# Patient Record
Sex: MALE | Race: WHITE | Employment: FULL TIME | URBAN - METROPOLITAN AREA
[De-identification: names, ages, dates, MRNs, and addresses within clinical notes are randomized per-mention and may not be internally consistent; named-entity substitution may affect disease eponyms.]

---

## 2018-05-31 ENCOUNTER — OFFICE VISIT (OUTPATIENT)
Dept: FAMILY MEDICINE CLINIC | Facility: CLINIC | Age: 38
End: 2018-05-31

## 2018-05-31 VITALS
BODY MASS INDEX: 35.94 KG/M2 | WEIGHT: 223.63 LBS | RESPIRATION RATE: 17 BRPM | DIASTOLIC BLOOD PRESSURE: 76 MMHG | SYSTOLIC BLOOD PRESSURE: 118 MMHG | TEMPERATURE: 99 F | HEART RATE: 94 BPM | OXYGEN SATURATION: 98 % | HEIGHT: 66 IN

## 2018-05-31 DIAGNOSIS — Z13.0 SCREENING FOR DEFICIENCY ANEMIA: Primary | ICD-10-CM

## 2018-05-31 DIAGNOSIS — Z00.00 WELL ADULT EXAM: ICD-10-CM

## 2018-05-31 DIAGNOSIS — Z13.220 SCREENING, LIPID: ICD-10-CM

## 2018-05-31 DIAGNOSIS — Z13.29 SCREENING FOR THYROID DISORDER: ICD-10-CM

## 2018-05-31 DIAGNOSIS — Z13.1 SCREENING FOR DIABETES MELLITUS: ICD-10-CM

## 2018-05-31 PROBLEM — E66.9 OBESITY (BMI 35.0-39.9 WITHOUT COMORBIDITY): Status: ACTIVE | Noted: 2018-05-31

## 2018-05-31 PROCEDURE — 99385 PREV VISIT NEW AGE 18-39: CPT | Performed by: FAMILY MEDICINE

## 2018-05-31 NOTE — PROGRESS NOTES
Bhavya Wilson is a 30year old male here for Patient presents with: Well Adult: Physical   Establish Care: Has not been seen in a couple of years   . HPI:    Patient complains of here for well exam.     Weight up 18#.  Lifting, non-impact cardi Drug use: No    Sexual activity: Yes    Partners: Female     Other Topics Concern    Caffeine Concern Yes    Comment: 2-3 cups coffee     Special Diet Yes    Comment: grains, legume, nuts, chicken, rare red meat, veggies    Exercise Yes    Comment: Lift, c Appearance:    Alert, cooperative, no distress, appears stated age   Head:    Normocephalic, without obvious abnormality, atraumatic   Eyes:    PERRL, conjunctiva/corneas clear, EOM's intact        Ears:    Normal   Nose:  Normal   Throat:   Lips, mucosa, week. Your BMI is 36 (obese range, but muscle build may affect it). Check body fat. Habits: To avoid harmful effects on health, we encourage less than 1 to 2 drinks daily (12 oz beer, 6 oz wine and 1.5 ounce alcohol are considered a \"drink. \") We also rec

## 2018-05-31 NOTE — PATIENT INSTRUCTIONS
Tdap or Td vaccine is recommended if no tetanus shot for 10 years due in 2020. Lifestyle: We encourage low fat diet with whole grains, fruits, vegetables, lean meats, fish and low-fat dairy. We encourage exercise 150 minutes per week.  Your BMI is 36 (obese

## 2018-06-16 ENCOUNTER — LAB ENCOUNTER (OUTPATIENT)
Dept: LAB | Age: 38
End: 2018-06-16
Attending: FAMILY MEDICINE
Payer: COMMERCIAL

## 2018-06-16 DIAGNOSIS — Z13.0 SCREENING FOR DEFICIENCY ANEMIA: ICD-10-CM

## 2018-06-16 DIAGNOSIS — Z13.1 SCREENING FOR DIABETES MELLITUS: ICD-10-CM

## 2018-06-16 DIAGNOSIS — Z13.220 SCREENING, LIPID: ICD-10-CM

## 2018-06-16 DIAGNOSIS — Z13.29 SCREENING FOR THYROID DISORDER: ICD-10-CM

## 2018-06-16 PROCEDURE — 80061 LIPID PANEL: CPT | Performed by: FAMILY MEDICINE

## 2018-06-16 PROCEDURE — 80050 GENERAL HEALTH PANEL: CPT | Performed by: FAMILY MEDICINE

## 2018-06-16 PROCEDURE — 83036 HEMOGLOBIN GLYCOSYLATED A1C: CPT | Performed by: FAMILY MEDICINE

## 2018-06-16 PROCEDURE — 36415 COLL VENOUS BLD VENIPUNCTURE: CPT | Performed by: FAMILY MEDICINE

## 2023-03-08 ENCOUNTER — TELEPHONE (OUTPATIENT)
Dept: FAMILY MEDICINE CLINIC | Facility: CLINIC | Age: 43
End: 2023-03-08

## 2023-03-16 ENCOUNTER — PATIENT OUTREACH (OUTPATIENT)
Dept: CASE MANAGEMENT | Age: 43
End: 2023-03-16

## 2023-03-16 NOTE — PROCEDURES
The office order for PCP removal request is Approved and finalized on March 16, 2023.     Thanks,  [facility] [employer]